# Patient Record
Sex: MALE | Employment: UNEMPLOYED | URBAN - METROPOLITAN AREA
[De-identification: names, ages, dates, MRNs, and addresses within clinical notes are randomized per-mention and may not be internally consistent; named-entity substitution may affect disease eponyms.]

---

## 2019-07-11 ENCOUNTER — TRANSFERRED RECORDS (OUTPATIENT)
Dept: HEALTH INFORMATION MANAGEMENT | Facility: CLINIC | Age: 44
End: 2019-07-11

## 2019-10-10 ENCOUNTER — TRANSFERRED RECORDS (OUTPATIENT)
Dept: HEALTH INFORMATION MANAGEMENT | Facility: CLINIC | Age: 44
End: 2019-10-10

## 2019-11-14 ENCOUNTER — TRANSFERRED RECORDS (OUTPATIENT)
Dept: HEALTH INFORMATION MANAGEMENT | Facility: CLINIC | Age: 44
End: 2019-11-14

## 2020-01-30 ENCOUNTER — TRANSFERRED RECORDS (OUTPATIENT)
Dept: HEALTH INFORMATION MANAGEMENT | Facility: CLINIC | Age: 45
End: 2020-01-30

## 2020-04-16 ENCOUNTER — TELEPHONE (OUTPATIENT)
Dept: TRANSPLANT | Facility: CLINIC | Age: 45
End: 2020-04-16

## 2020-04-16 NOTE — TELEPHONE ENCOUNTER
Called patient at home & cell numbers lvm on both, asking for a call back to discuss if he has capability of virtual visits on Thurs, April 30

## 2021-09-03 ENCOUNTER — TELEPHONE (OUTPATIENT)
Dept: TRANSPLANT | Facility: CLINIC | Age: 46
End: 2021-09-03

## 2021-09-03 NOTE — LETTER
September 3, 2021    Nicole Shetty  [id number] [place] [address]    Dear Mr. Shetty,    The purpose of this letter is an attempt to communicate with you. We started the referral process with you in January 2020. You were not interested in scheduling an evaluation at first and then agreed to be seen in April 2020. This was delayed by COVID. Again, scheduling has tried you a total of 8 times to get you scheduled for an evaluation and you do not return our calls. We can only assume you are no longer interested in transplant and will now close your referral. No further attempt by our office will be made to try and contact you. If you are interested in transplant in the future, your nephrologist may re-refer you.      Sincerely,       Ave Neely, RN, BSN Transplant Coordinator  [facility] [address]  [address] [place] [id number]  Phone [phone]  Fax [phone]  [email or web address]    CC:Constantin Alcazar [facility]

## 2021-09-03 NOTE — TELEPHONE ENCOUNTER
Left voice message that we have been trying to reach patient to schedule his evaluation for kidney transplant and there is not a return call. Let them know his referral is closed and if he is interested in transplant in the future he can be re-referred by his nephrologist. Closure letter sent.